# Patient Record
Sex: FEMALE | Race: WHITE | HISPANIC OR LATINO | URBAN - METROPOLITAN AREA
[De-identification: names, ages, dates, MRNs, and addresses within clinical notes are randomized per-mention and may not be internally consistent; named-entity substitution may affect disease eponyms.]

---

## 2018-05-15 PROBLEM — Z00.00 ENCOUNTER FOR PREVENTIVE HEALTH EXAMINATION: Status: ACTIVE | Noted: 2018-05-15

## 2018-05-22 ENCOUNTER — OUTPATIENT (OUTPATIENT)
Dept: OUTPATIENT SERVICES | Facility: HOSPITAL | Age: 63
LOS: 1 days | End: 2018-05-22
Payer: COMMERCIAL

## 2018-05-22 VITALS
HEART RATE: 80 BPM | RESPIRATION RATE: 16 BRPM | HEIGHT: 60 IN | SYSTOLIC BLOOD PRESSURE: 140 MMHG | TEMPERATURE: 98 F | WEIGHT: 136.03 LBS | DIASTOLIC BLOOD PRESSURE: 86 MMHG

## 2018-05-22 DIAGNOSIS — R93.5 ABNORMAL FINDINGS ON DIAGNOSTIC IMAGING OF OTHER ABDOMINAL REGIONS, INCLUDING RETROPERITONEUM: ICD-10-CM

## 2018-05-22 DIAGNOSIS — Z98.890 OTHER SPECIFIED POSTPROCEDURAL STATES: Chronic | ICD-10-CM

## 2018-05-22 DIAGNOSIS — N63.0 UNSPECIFIED LUMP IN UNSPECIFIED BREAST: Chronic | ICD-10-CM

## 2018-05-22 DIAGNOSIS — Z01.818 ENCOUNTER FOR OTHER PREPROCEDURAL EXAMINATION: ICD-10-CM

## 2018-05-22 LAB
ANION GAP SERPL CALC-SCNC: 7 MMOL/L — SIGNIFICANT CHANGE UP (ref 5–17)
BUN SERPL-MCNC: 17 MG/DL — SIGNIFICANT CHANGE UP (ref 7–23)
CALCIUM SERPL-MCNC: 9.8 MG/DL — SIGNIFICANT CHANGE UP (ref 8.5–10.1)
CHLORIDE SERPL-SCNC: 103 MMOL/L — SIGNIFICANT CHANGE UP (ref 96–108)
CO2 SERPL-SCNC: 32 MMOL/L — HIGH (ref 22–31)
CREAT SERPL-MCNC: 0.74 MG/DL — SIGNIFICANT CHANGE UP (ref 0.5–1.3)
GLUCOSE SERPL-MCNC: 131 MG/DL — HIGH (ref 70–99)
HCT VFR BLD CALC: 38.9 % — SIGNIFICANT CHANGE UP (ref 34.5–45)
HGB BLD-MCNC: 13.2 G/DL — SIGNIFICANT CHANGE UP (ref 11.5–15.5)
MCHC RBC-ENTMCNC: 30.6 PG — SIGNIFICANT CHANGE UP (ref 27–34)
MCHC RBC-ENTMCNC: 33.9 GM/DL — SIGNIFICANT CHANGE UP (ref 32–36)
MCV RBC AUTO: 90.3 FL — SIGNIFICANT CHANGE UP (ref 80–100)
NRBC # BLD: 0 /100 WBCS — SIGNIFICANT CHANGE UP (ref 0–0)
PLATELET # BLD AUTO: 250 K/UL — SIGNIFICANT CHANGE UP (ref 150–400)
POTASSIUM SERPL-MCNC: 4.2 MMOL/L — SIGNIFICANT CHANGE UP (ref 3.5–5.3)
POTASSIUM SERPL-SCNC: 4.2 MMOL/L — SIGNIFICANT CHANGE UP (ref 3.5–5.3)
RBC # BLD: 4.31 M/UL — SIGNIFICANT CHANGE UP (ref 3.8–5.2)
RBC # FLD: 12.9 % — SIGNIFICANT CHANGE UP (ref 10.3–14.5)
SODIUM SERPL-SCNC: 142 MMOL/L — SIGNIFICANT CHANGE UP (ref 135–145)
WBC # BLD: 6.1 K/UL — SIGNIFICANT CHANGE UP (ref 3.8–10.5)
WBC # FLD AUTO: 6.1 K/UL — SIGNIFICANT CHANGE UP (ref 3.8–10.5)

## 2018-05-22 PROCEDURE — 93010 ELECTROCARDIOGRAM REPORT: CPT | Mod: NC

## 2018-05-22 PROCEDURE — 85027 COMPLETE CBC AUTOMATED: CPT

## 2018-05-22 PROCEDURE — 36415 COLL VENOUS BLD VENIPUNCTURE: CPT

## 2018-05-22 PROCEDURE — 86901 BLOOD TYPING SEROLOGIC RH(D): CPT

## 2018-05-22 PROCEDURE — G0463: CPT

## 2018-05-22 PROCEDURE — 86900 BLOOD TYPING SEROLOGIC ABO: CPT

## 2018-05-22 PROCEDURE — 93005 ELECTROCARDIOGRAM TRACING: CPT

## 2018-05-22 PROCEDURE — 80048 BASIC METABOLIC PNL TOTAL CA: CPT

## 2018-05-22 PROCEDURE — 86850 RBC ANTIBODY SCREEN: CPT

## 2018-05-22 NOTE — H&P PST ADULT - FAMILY HISTORY
Mother  Still living? Yes, Estimated age: 81-90  Family history of type 2 diabetes mellitus in mother, Age at diagnosis: Age Unknown  Hypertension, Age at diagnosis: Age Unknown     Father  Still living? No  Family history of stroke, Age at diagnosis: Age Unknown

## 2018-05-22 NOTE — H&P PST ADULT - PSH
Breast nodule    Condyloma  vulvar laser  H/O colonoscopy    H/O:   x3  History of D&C    S/P Tubal Ligation

## 2018-05-22 NOTE — H&P PST ADULT - ASSESSMENT
63 yo female with abnormal findings on a diagnostic imaging scheduled for a D&C hysteroscopy on 5/30 with Dr. Anthony.

## 2018-05-22 NOTE — H&P PST ADULT - PMH
Abnormal findings on diagnostic imaging of other abdominal regions, including retroperitoneum    Asthma  mild(allergy related) pt notes "asthma attack when around dogs or cats"  Endometrial thickening on ultrasound    Hypertension    Kidney stones    Seasonal allergic rhinitis, unspecified trigger

## 2018-05-22 NOTE — H&P PST ADULT - HISTORY OF PRESENT ILLNESS
63 yo female with HTN LMP age 59, presents to [facility] scheduled for a D&C hysteroscopy on  with Dr. Anthony. Was found to have endometrial thickening on a routine ultrasound at her routine GYN visit. Denies pelvic pain and PMB. States that they need to a do a biopsy of the lining while she's asleep. An attempt in the office, was unsuccessful. H/O D&C 3-4 years ago secondary to heavy bleeding from fibroids. Denies recent H/O bleeding.

## 2018-05-22 NOTE — H&P PST ADULT - NSANTHOSAYNRD_GEN_A_CORE
No. HALINA screening performed.  STOP BANG Legend: 0-2 = LOW Risk; 3-4 = INTERMEDIATE Risk; 5-8 = HIGH Risk

## 2018-05-22 NOTE — H&P PST ADULT - PROBLEM SELECTOR PLAN 2
Labs- CBC, BMP, T&S and EKG  MC with Dr. CONNIE Naylor  Pre op instructions reviewed and given. Take routine am meds DOS with sip of water. Hold ASA and supplements for one week. Avoid NSAIDs and OTC supplements. Verbalized understanding

## 2018-05-29 ENCOUNTER — TRANSCRIPTION ENCOUNTER (OUTPATIENT)
Age: 63
End: 2018-05-29

## 2018-05-29 RX ORDER — SODIUM CHLORIDE 9 MG/ML
1000 INJECTION, SOLUTION INTRAVENOUS
Qty: 0 | Refills: 0 | Status: DISCONTINUED | OUTPATIENT
Start: 2018-05-30 | End: 2018-05-30

## 2018-05-30 ENCOUNTER — RESULT REVIEW (OUTPATIENT)
Age: 63
End: 2018-05-30

## 2018-05-30 ENCOUNTER — OUTPATIENT (OUTPATIENT)
Dept: OUTPATIENT SERVICES | Facility: HOSPITAL | Age: 63
LOS: 1 days | End: 2018-05-30
Payer: COMMERCIAL

## 2018-05-30 VITALS
DIASTOLIC BLOOD PRESSURE: 80 MMHG | RESPIRATION RATE: 14 BRPM | HEART RATE: 74 BPM | SYSTOLIC BLOOD PRESSURE: 150 MMHG | OXYGEN SATURATION: 98 % | TEMPERATURE: 98 F

## 2018-05-30 VITALS
RESPIRATION RATE: 14 BRPM | SYSTOLIC BLOOD PRESSURE: 125 MMHG | OXYGEN SATURATION: 97 % | TEMPERATURE: 98 F | HEIGHT: 60 IN | WEIGHT: 136.03 LBS | HEART RATE: 71 BPM | DIASTOLIC BLOOD PRESSURE: 86 MMHG

## 2018-05-30 DIAGNOSIS — Z98.890 OTHER SPECIFIED POSTPROCEDURAL STATES: Chronic | ICD-10-CM

## 2018-05-30 DIAGNOSIS — N63.0 UNSPECIFIED LUMP IN UNSPECIFIED BREAST: Chronic | ICD-10-CM

## 2018-05-30 DIAGNOSIS — R93.5 ABNORMAL FINDINGS ON DIAGNOSTIC IMAGING OF OTHER ABDOMINAL REGIONS, INCLUDING RETROPERITONEUM: ICD-10-CM

## 2018-05-30 DIAGNOSIS — Z01.818 ENCOUNTER FOR OTHER PREPROCEDURAL EXAMINATION: ICD-10-CM

## 2018-05-30 PROCEDURE — 88305 TISSUE EXAM BY PATHOLOGIST: CPT | Mod: 26

## 2018-05-30 PROCEDURE — 88305 TISSUE EXAM BY PATHOLOGIST: CPT

## 2018-05-30 PROCEDURE — 58558 HYSTEROSCOPY BIOPSY: CPT

## 2018-05-30 RX ORDER — OXYCODONE AND ACETAMINOPHEN 5; 325 MG/1; MG/1
1 TABLET ORAL EVERY 4 HOURS
Qty: 0 | Refills: 0 | Status: DISCONTINUED | OUTPATIENT
Start: 2018-05-30 | End: 2018-05-30

## 2018-05-30 RX ORDER — HYDROMORPHONE HYDROCHLORIDE 2 MG/ML
0.5 INJECTION INTRAMUSCULAR; INTRAVENOUS; SUBCUTANEOUS
Qty: 0 | Refills: 0 | Status: DISCONTINUED | OUTPATIENT
Start: 2018-05-30 | End: 2018-05-30

## 2018-05-30 RX ORDER — SODIUM CHLORIDE 9 MG/ML
1000 INJECTION, SOLUTION INTRAVENOUS
Qty: 0 | Refills: 0 | Status: DISCONTINUED | OUTPATIENT
Start: 2018-05-30 | End: 2018-05-30

## 2018-05-30 RX ORDER — ACETAMINOPHEN 500 MG
650 TABLET ORAL ONCE
Qty: 0 | Refills: 0 | Status: COMPLETED | OUTPATIENT
Start: 2018-05-30 | End: 2018-05-30

## 2018-05-30 RX ORDER — ONDANSETRON 8 MG/1
4 TABLET, FILM COATED ORAL ONCE
Qty: 0 | Refills: 0 | Status: DISCONTINUED | OUTPATIENT
Start: 2018-05-30 | End: 2018-05-30

## 2018-05-30 RX ORDER — METOCLOPRAMIDE HCL 10 MG
10 TABLET ORAL ONCE
Qty: 0 | Refills: 0 | Status: DISCONTINUED | OUTPATIENT
Start: 2018-05-30 | End: 2018-05-30

## 2018-05-30 RX ORDER — OXYCODONE HYDROCHLORIDE 5 MG/1
10 TABLET ORAL EVERY 6 HOURS
Qty: 0 | Refills: 0 | Status: DISCONTINUED | OUTPATIENT
Start: 2018-05-30 | End: 2018-05-30

## 2018-05-30 RX ORDER — OXYCODONE HYDROCHLORIDE 5 MG/1
5 TABLET ORAL EVERY 4 HOURS
Qty: 0 | Refills: 0 | Status: DISCONTINUED | OUTPATIENT
Start: 2018-05-30 | End: 2018-05-30

## 2018-05-30 RX ORDER — MEPERIDINE HYDROCHLORIDE 50 MG/ML
12.5 INJECTION INTRAMUSCULAR; INTRAVENOUS; SUBCUTANEOUS
Qty: 0 | Refills: 0 | Status: DISCONTINUED | OUTPATIENT
Start: 2018-05-30 | End: 2018-05-30

## 2018-05-30 RX ADMIN — Medication 650 MILLIGRAM(S): at 11:16

## 2018-05-30 RX ADMIN — SODIUM CHLORIDE 100 MILLILITER(S): 9 INJECTION, SOLUTION INTRAVENOUS at 16:17

## 2018-05-30 RX ADMIN — Medication 650 MILLIGRAM(S): at 11:14

## 2018-05-30 RX ADMIN — SODIUM CHLORIDE 50 MILLILITER(S): 9 INJECTION, SOLUTION INTRAVENOUS at 11:14

## 2018-05-30 NOTE — ASU DISCHARGE PLAN (ADULT/PEDIATRIC). - MEDICATION SUMMARY - MEDICATIONS TO TAKE
I will START or STAY ON the medications listed below when I get home from the hospital:    aspirin 81 mg oral tablet  -- 1 tab(s) by mouth 3 times a week - IN AM  -- Indication: For Cardiovascular agent    Vasotec 10 mg oral tablet  -- 1 tab(s) by mouth once a day - IN AM  -- Indication: For Antihypertensive    VENTOLIN HFA AER  -- Indication: For Bronchodilator    Norvasc 5 mg oral tablet  -- 1 tab(s) by mouth once a day - IN AM  -- Indication: For Antihypertensive    Cinnamon 500 mg oral capsule  -- 1 cap(s) by mouth once a day - IN AM  -- Indication: For Herbal    Centrum oral tablet  -- 1 tab(s) by mouth once a day - IN AM  -- Indication: For Vitamin    Caltrate 600 + D oral tablet  -- 1 tab(s) by mouth once a day - IN AM  -- Indication: For Vitamin

## 2018-05-30 NOTE — ASU DISCHARGE PLAN (ADULT/PEDIATRIC). - ACTIVITY LEVEL
nothing per rectum/no tampons/no exercise/no heavy lifting/nothing per vagina/no intercourse/no douching/no sports/gym/no tub baths

## 2018-06-01 LAB — SURGICAL PATHOLOGY FINAL REPORT - CH: SIGNIFICANT CHANGE UP

## 2019-04-29 PROBLEM — N20.0 CALCULUS OF KIDNEY: Chronic | Status: ACTIVE | Noted: 2018-05-21

## 2019-04-29 PROBLEM — J30.2 OTHER SEASONAL ALLERGIC RHINITIS: Chronic | Status: ACTIVE | Noted: 2018-05-21

## 2019-04-29 PROBLEM — R93.8 ABNORMAL FINDINGS ON DIAGNOSTIC IMAGING OF OTHER SPECIFIED BODY STRUCTURES: Chronic | Status: ACTIVE | Noted: 2018-05-22

## 2019-04-29 PROBLEM — R93.5 ABNORMAL FINDINGS ON DIAGNOSTIC IMAGING OF OTHER ABDOMINAL REGIONS, INCLUDING RETROPERITONEUM: Chronic | Status: ACTIVE | Noted: 2018-05-21

## 2019-05-20 ENCOUNTER — APPOINTMENT (OUTPATIENT)
Dept: SURGERY | Facility: CLINIC | Age: 64
End: 2019-05-20
Payer: COMMERCIAL

## 2019-05-20 VITALS
RESPIRATION RATE: 17 BRPM | SYSTOLIC BLOOD PRESSURE: 145 MMHG | WEIGHT: 139 LBS | DIASTOLIC BLOOD PRESSURE: 86 MMHG | TEMPERATURE: 98 F | OXYGEN SATURATION: 98 % | BODY MASS INDEX: 27.29 KG/M2 | HEIGHT: 60 IN | HEART RATE: 82 BPM

## 2019-05-20 DIAGNOSIS — Z98.890 OTHER SPECIFIED POSTPROCEDURAL STATES: ICD-10-CM

## 2019-05-20 DIAGNOSIS — I10 ESSENTIAL (PRIMARY) HYPERTENSION: ICD-10-CM

## 2019-05-20 PROCEDURE — 99244 OFF/OP CNSLTJ NEW/EST MOD 40: CPT

## 2019-05-20 RX ORDER — ATORVASTATIN CALCIUM 20 MG/1
20 TABLET, FILM COATED ORAL
Refills: 0 | Status: ACTIVE | COMMUNITY

## 2019-05-20 RX ORDER — ENALAPRIL MALEATE 10 MG/1
10 TABLET ORAL
Refills: 0 | Status: ACTIVE | COMMUNITY

## 2019-05-20 RX ORDER — AMLODIPINE BESYLATE 5 MG/1
5 TABLET ORAL
Refills: 0 | Status: ACTIVE | COMMUNITY

## 2019-06-17 NOTE — HISTORY OF PRESENT ILLNESS
[FreeTextEntry1] : 62 y/o female here for consultation for lump on the left buttock. Reports having the lump for years but has grown in size and noticeable now. Today patient has no pain but discomfort. No drainage or redness.

## 2019-06-17 NOTE — CONSULT LETTER
[Dear  ___] : Dear  [unfilled], [Consult Letter:] : I had the pleasure of evaluating your patient, [unfilled]. [Please see my note below.] : Please see my note below. [Consult Closing:] : Thank you very much for allowing me to participate in the care of this patient.  If you have any questions, please do not hesitate to contact me. [Sincerely,] : Sincerely, [FreeTextEntry3] : Suad Aldana M.D., F.A.C.S., F.FABIAN.S.C.R.S.\par Assistant Professor of Surgery\par [employer] [facility]\par \par  [FreeTextEntry2] : Dr Ritchie Anthony

## 2019-06-17 NOTE — ASSESSMENT
[FreeTextEntry1] : 64 yo female with a left buttock cyst. I recommended excision and discussed the details, risks, benefits and alternatives of the procedure and expectations of recovery, including the possibility of wound breakdown and secondary intent healing if that happens.  \par She will call my office if she wishes to proceed with the surgery.\par

## 2019-06-17 NOTE — PHYSICAL EXAM
[FreeTextEntry1] : This is a 63 year-old well-developed female in no apparent distress.\par \par HEENT normocephalic, anicteric, external ears normal bilaterally, EOMs intact.\par \par Lungs - clear to auscultation bilaterally\par \par Cardiac - regular rate and rhythm.\par \par Abdomen soft, nontender, nondistended, no masses. No hepatosplenomegaly.\par \par No inguinal lymphadenopathy bilaterally.\par \par There is a subcutaneous round growth most c/w a cyst at the junction of the left buttock and upper posterior thigh. Measures 3.5 x 3.5 cm.

## 2019-06-17 NOTE — ASSESSMENT
[FreeTextEntry1] : 62 yo female with a left buttock cyst. I recommended excision and discussed the details, risks, benefits and alternatives of the procedure and expectations of recovery, including the possibility of wound breakdown and secondary intent healing if that happens.  \par She will call my office if she wishes to proceed with the surgery.\par

## 2019-06-17 NOTE — HISTORY OF PRESENT ILLNESS
[FreeTextEntry1] : 64 y/o female here for consultation for lump on the left buttock. Reports having the lump for years but has grown in size and noticeable now. Today patient has no pain but discomfort. No drainage or redness.

## 2021-05-12 ENCOUNTER — APPOINTMENT (OUTPATIENT)
Dept: SURGERY | Facility: CLINIC | Age: 66
End: 2021-05-12
Payer: COMMERCIAL

## 2021-05-12 VITALS
HEIGHT: 60 IN | SYSTOLIC BLOOD PRESSURE: 128 MMHG | BODY MASS INDEX: 27.48 KG/M2 | HEART RATE: 79 BPM | RESPIRATION RATE: 18 BRPM | WEIGHT: 140 LBS | TEMPERATURE: 99 F | DIASTOLIC BLOOD PRESSURE: 82 MMHG | OXYGEN SATURATION: 100 %

## 2021-05-12 PROCEDURE — 99214 OFFICE O/P EST MOD 30 MIN: CPT

## 2021-05-12 PROCEDURE — 99072 ADDL SUPL MATRL&STAF TM PHE: CPT

## 2021-05-12 NOTE — PHYSICAL EXAM
[FreeTextEntry1] : This is a 65 year-old well-developed female in no apparent distress.\par \par HEENT normocephalic, anicteric, external ears normal bilaterally, EOMs intact.\par \par Cardiac - regular rate and rhythm.\par \par There is a subcutaneous round growth at the junction of the left buttock and upper posterior thigh. Measures 3.5 x 3.5 cm.  Differential includes cyst versus lipoma.  On today's exam it is most consistent with a lipoma.

## 2021-05-12 NOTE — CONSULT LETTER
[Dear  ___] : Dear  [unfilled], [Courtesy Letter:] : I had the pleasure of seeing your patient, [unfilled], in my office today. [Please see my note below.] : Please see my note below. [Consult Closing:] : Thank you very much for allowing me to participate in the care of this patient.  If you have any questions, please do not hesitate to contact me. [Sincerely,] : Sincerely, [FreeTextEntry2] : Dr Ritchie Anthony [FreeTextEntry3] : Suad Aldana M.D., F.A.C.S., F.FABIAN.S.C.R.S.\par Assistant Professor of Surgery\par [employer] at [facility]\par \par

## 2021-05-12 NOTE — ASSESSMENT
[FreeTextEntry1] : 64 yo female with a left buttock lipoma that has been getting progressively more symptomatic. I recommended excision and discussed the details, risks, benefits and alternatives of the procedure and expectations of recovery, including the possibility of wound breakdown and secondary intent healing if that happens.  \par She wishes to proceed with the surgery and will arrange with my office.\par

## 2021-05-12 NOTE — HISTORY OF PRESENT ILLNESS
[FreeTextEntry1] : 66 y/o female with a hx of a lump of the left buttock for which I evaluated her on 5/20/2019 and recommended excision.  \par Today she reports the left buttock lump has been getting bigger in size. States more discomfort with prolonged period of sitting. Denies the use of blood thinners.

## 2021-05-21 ENCOUNTER — NON-APPOINTMENT (OUTPATIENT)
Age: 66
End: 2021-05-21

## 2021-05-30 NOTE — H&P PST ADULT - VENOUS THROMBOEMBOLISM HISTORY
[FreeTextEntry1] : \par \par #Acid Reflux\par - Continue omeprazole\par - diet recommendations- avoid spicy, large meals, caffeine, avoid eating heavy at night\par \par #Constipation\par - Willl order for senna\par \par \par #Slightly Elevated BP\par - Advised to purchase home monitor and check at home\par - re-assess at next visit\par \par HCM- patient qualifies for CSP in August : Pap and Mammo\par \par RTC in August for CSP\par \par Above discussed with Dr. Peralta
negative history

## 2021-06-10 ENCOUNTER — OUTPATIENT (OUTPATIENT)
Dept: OUTPATIENT SERVICES | Facility: HOSPITAL | Age: 66
LOS: 1 days | End: 2021-06-10
Payer: COMMERCIAL

## 2021-06-10 VITALS
HEIGHT: 60 IN | WEIGHT: 138.89 LBS | RESPIRATION RATE: 16 BRPM | TEMPERATURE: 98 F | SYSTOLIC BLOOD PRESSURE: 136 MMHG | OXYGEN SATURATION: 98 % | DIASTOLIC BLOOD PRESSURE: 83 MMHG | HEART RATE: 81 BPM

## 2021-06-10 DIAGNOSIS — N63.0 UNSPECIFIED LUMP IN UNSPECIFIED BREAST: Chronic | ICD-10-CM

## 2021-06-10 DIAGNOSIS — Z01.818 ENCOUNTER FOR OTHER PREPROCEDURAL EXAMINATION: ICD-10-CM

## 2021-06-10 DIAGNOSIS — D17.1 BENIGN LIPOMATOUS NEOPLASM OF SKIN AND SUBCUTANEOUS TISSUE OF TRUNK: ICD-10-CM

## 2021-06-10 DIAGNOSIS — Z98.890 OTHER SPECIFIED POSTPROCEDURAL STATES: Chronic | ICD-10-CM

## 2021-06-10 LAB
ANION GAP SERPL CALC-SCNC: 15 MMOL/L — SIGNIFICANT CHANGE UP (ref 5–17)
BUN SERPL-MCNC: 17 MG/DL — SIGNIFICANT CHANGE UP (ref 7–23)
CALCIUM SERPL-MCNC: 10.6 MG/DL — HIGH (ref 8.4–10.5)
CHLORIDE SERPL-SCNC: 104 MMOL/L — SIGNIFICANT CHANGE UP (ref 96–108)
CO2 SERPL-SCNC: 23 MMOL/L — SIGNIFICANT CHANGE UP (ref 22–31)
CREAT SERPL-MCNC: 0.74 MG/DL — SIGNIFICANT CHANGE UP (ref 0.5–1.3)
GLUCOSE SERPL-MCNC: 88 MG/DL — SIGNIFICANT CHANGE UP (ref 70–99)
HCT VFR BLD CALC: 40.9 % — SIGNIFICANT CHANGE UP (ref 34.5–45)
HGB BLD-MCNC: 13.9 G/DL — SIGNIFICANT CHANGE UP (ref 11.5–15.5)
MCHC RBC-ENTMCNC: 31.2 PG — SIGNIFICANT CHANGE UP (ref 27–34)
MCHC RBC-ENTMCNC: 34 GM/DL — SIGNIFICANT CHANGE UP (ref 32–36)
MCV RBC AUTO: 91.7 FL — SIGNIFICANT CHANGE UP (ref 80–100)
NRBC # BLD: 0 /100 WBCS — SIGNIFICANT CHANGE UP (ref 0–0)
PLATELET # BLD AUTO: 245 K/UL — SIGNIFICANT CHANGE UP (ref 150–400)
POTASSIUM SERPL-MCNC: 3.8 MMOL/L — SIGNIFICANT CHANGE UP (ref 3.5–5.3)
POTASSIUM SERPL-SCNC: 3.8 MMOL/L — SIGNIFICANT CHANGE UP (ref 3.5–5.3)
RBC # BLD: 4.46 M/UL — SIGNIFICANT CHANGE UP (ref 3.8–5.2)
RBC # FLD: 12.3 % — SIGNIFICANT CHANGE UP (ref 10.3–14.5)
SODIUM SERPL-SCNC: 142 MMOL/L — SIGNIFICANT CHANGE UP (ref 135–145)
WBC # BLD: 8.8 K/UL — SIGNIFICANT CHANGE UP (ref 3.8–10.5)
WBC # FLD AUTO: 8.8 K/UL — SIGNIFICANT CHANGE UP (ref 3.8–10.5)

## 2021-06-10 PROCEDURE — G0463: CPT

## 2021-06-10 PROCEDURE — 80048 BASIC METABOLIC PNL TOTAL CA: CPT

## 2021-06-10 PROCEDURE — 85027 COMPLETE CBC AUTOMATED: CPT

## 2021-06-10 RX ORDER — LIDOCAINE HCL 20 MG/ML
0.2 VIAL (ML) INJECTION ONCE
Refills: 0 | Status: DISCONTINUED | OUTPATIENT
Start: 2021-06-24 | End: 2021-07-08

## 2021-06-10 RX ORDER — ASPIRIN/CALCIUM CARB/MAGNESIUM 324 MG
1 TABLET ORAL
Qty: 0 | Refills: 0 | DISCHARGE

## 2021-06-10 RX ORDER — SODIUM CHLORIDE 9 MG/ML
3 INJECTION INTRAMUSCULAR; INTRAVENOUS; SUBCUTANEOUS EVERY 8 HOURS
Refills: 0 | Status: DISCONTINUED | OUTPATIENT
Start: 2021-06-24 | End: 2021-07-08

## 2021-06-10 RX ORDER — AMLODIPINE BESYLATE 2.5 MG/1
1 TABLET ORAL
Qty: 0 | Refills: 0 | DISCHARGE

## 2021-06-10 NOTE — H&P PST ADULT - NSICDXPASTMEDICALHX_GEN_ALL_CORE_FT
PAST MEDICAL HISTORY:  Abnormal findings on diagnostic imaging of other abdominal regions, including retroperitoneum     Asthma mild(allergy related) pt notes "asthma attack when around dogs or cats"    Endometrial thickening on ultrasound     Hypercholesteremia     Hypertension     Kidney stones     Lipoma Left buttock    Seasonal allergic rhinitis, unspecified trigger

## 2021-06-10 NOTE — H&P PST ADULT - NSICDXPROBLEM_GEN_ALL_CORE_FT
PROBLEM DIAGNOSES  Problem: Lipoma of buttock  Assessment and Plan: Pt is scheduled for excision of lipoma of left buttock  NPO after 11PM  Pt advised to take Amlodipine and Enalapril in AM DOS with sip of water   will drive patient home DOS

## 2021-06-10 NOTE — H&P PST ADULT - HISTORY OF PRESENT ILLNESS
Pt is a 65y.o. female who presents with 4 year h/o a lipoma on her left buttock. Pt states it has increased in size and is now uncomfortable if she sits for a long time. She is scheduled for excision of lipoma of buttock with Dr. Aldana on 6/24/2021.    Pt had Pfizer Covid vaccine - 2nd dose May 2021- card presented at [facility] for confirmation. Pt is a 65y.o. female who presents with 4 year h/o a lipoma on her left buttock. Pt states it has increased in size and is now uncomfortable if she sits for a long time. She is scheduled for excision of lipoma of buttock with Dr. Aldana on 6/24/2021.    Pt had Pfizer Covid vaccine - 2nd dose May 2021- card presented at [facility] for confirmation. Documentation on chart.

## 2021-06-10 NOTE — H&P PST ADULT - NSICDXPASTSURGICALHX_GEN_ALL_CORE_FT
PAST SURGICAL HISTORY:  Breast nodule     Condyloma vulvar laser    H/O colonoscopy     H/O:  x3    History of D&C     S/P Tubal Ligation

## 2021-06-10 NOTE — H&P PST ADULT - NSICDXFAMILYHX_GEN_ALL_CORE_FT
FAMILY HISTORY:  Father  Still living? No  Family history of stroke, Age at diagnosis: Age Unknown    Mother  Still living? Yes, Estimated age: 81-90  Family history of type 2 diabetes mellitus in mother, Age at diagnosis: Age Unknown  Hypertension, Age at diagnosis: Age Unknown

## 2021-06-17 PROBLEM — D17.9 BENIGN LIPOMATOUS NEOPLASM, UNSPECIFIED: Chronic | Status: ACTIVE | Noted: 2021-06-10

## 2021-06-17 PROBLEM — E78.00 PURE HYPERCHOLESTEROLEMIA, UNSPECIFIED: Chronic | Status: ACTIVE | Noted: 2021-06-10

## 2021-06-23 ENCOUNTER — TRANSCRIPTION ENCOUNTER (OUTPATIENT)
Age: 66
End: 2021-06-23

## 2021-06-24 ENCOUNTER — OUTPATIENT (OUTPATIENT)
Dept: OUTPATIENT SERVICES | Facility: HOSPITAL | Age: 66
LOS: 1 days | End: 2021-06-24
Payer: COMMERCIAL

## 2021-06-24 ENCOUNTER — APPOINTMENT (OUTPATIENT)
Dept: SURGERY | Facility: HOSPITAL | Age: 66
End: 2021-06-24
Payer: COMMERCIAL

## 2021-06-24 ENCOUNTER — RESULT REVIEW (OUTPATIENT)
Age: 66
End: 2021-06-24

## 2021-06-24 VITALS
OXYGEN SATURATION: 98 % | WEIGHT: 138.89 LBS | DIASTOLIC BLOOD PRESSURE: 70 MMHG | RESPIRATION RATE: 16 BRPM | SYSTOLIC BLOOD PRESSURE: 113 MMHG | HEART RATE: 71 BPM | HEIGHT: 60 IN | TEMPERATURE: 98 F

## 2021-06-24 VITALS
HEART RATE: 80 BPM | SYSTOLIC BLOOD PRESSURE: 125 MMHG | RESPIRATION RATE: 17 BRPM | OXYGEN SATURATION: 98 % | TEMPERATURE: 99 F | DIASTOLIC BLOOD PRESSURE: 70 MMHG

## 2021-06-24 DIAGNOSIS — N63.0 UNSPECIFIED LUMP IN UNSPECIFIED BREAST: Chronic | ICD-10-CM

## 2021-06-24 DIAGNOSIS — Z98.890 OTHER SPECIFIED POSTPROCEDURAL STATES: Chronic | ICD-10-CM

## 2021-06-24 DIAGNOSIS — D17.1 BENIGN LIPOMATOUS NEOPLASM OF SKIN AND SUBCUTANEOUS TISSUE OF TRUNK: ICD-10-CM

## 2021-06-24 PROCEDURE — C1889: CPT

## 2021-06-24 PROCEDURE — 88304 TISSUE EXAM BY PATHOLOGIST: CPT | Mod: 26

## 2021-06-24 PROCEDURE — 12032 INTMD RPR S/A/T/EXT 2.6-7.5: CPT | Mod: 59

## 2021-06-24 PROCEDURE — 27043 EXC HIP PELVIS LES SC 3 CM/>: CPT

## 2021-06-24 PROCEDURE — 27043 EXC HIP PELVIS LES SC 3 CM/>: CPT | Mod: LT

## 2021-06-24 PROCEDURE — 88304 TISSUE EXAM BY PATHOLOGIST: CPT

## 2021-06-24 RX ORDER — ATORVASTATIN CALCIUM 80 MG/1
1 TABLET, FILM COATED ORAL
Qty: 0 | Refills: 0 | DISCHARGE

## 2021-06-24 RX ORDER — OXYCODONE HYDROCHLORIDE 5 MG/1
1 TABLET ORAL
Qty: 5 | Refills: 0
Start: 2021-06-24 | End: 2021-06-24

## 2021-06-24 RX ORDER — MULTIVIT-MIN/FERROUS GLUCONATE 9 MG/15 ML
1 LIQUID (ML) ORAL
Qty: 0 | Refills: 0 | DISCHARGE

## 2021-06-24 RX ORDER — ALBUTEROL 90 UG/1
0 AEROSOL, METERED ORAL
Qty: 54 | Refills: 0 | DISCHARGE

## 2021-06-24 RX ORDER — ONDANSETRON 8 MG/1
4 TABLET, FILM COATED ORAL ONCE
Refills: 0 | Status: DISCONTINUED | OUTPATIENT
Start: 2021-06-24 | End: 2021-07-08

## 2021-06-24 RX ORDER — OXYCODONE HYDROCHLORIDE 5 MG/1
5 TABLET ORAL ONCE
Refills: 0 | Status: DISCONTINUED | OUTPATIENT
Start: 2021-06-24 | End: 2021-06-24

## 2021-06-24 RX ORDER — CHLORHEXIDINE GLUCONATE 213 G/1000ML
1 SOLUTION TOPICAL ONCE
Refills: 0 | Status: COMPLETED | OUTPATIENT
Start: 2021-06-24 | End: 2021-06-24

## 2021-06-24 RX ORDER — FENTANYL CITRATE 50 UG/ML
25 INJECTION INTRAVENOUS
Refills: 0 | Status: DISCONTINUED | OUTPATIENT
Start: 2021-06-24 | End: 2021-06-24

## 2021-06-24 RX ORDER — CINNAMON BARK 500 MG
1 CAPSULE ORAL
Qty: 0 | Refills: 0 | DISCHARGE

## 2021-06-24 RX ORDER — ACETAMINOPHEN 500 MG
2 TABLET ORAL
Qty: 0 | Refills: 0 | DISCHARGE

## 2021-06-24 RX ORDER — AMLODIPINE BESYLATE 2.5 MG/1
1 TABLET ORAL
Qty: 0 | Refills: 0 | DISCHARGE

## 2021-06-24 RX ADMIN — CHLORHEXIDINE GLUCONATE 1 APPLICATION(S): 213 SOLUTION TOPICAL at 09:53

## 2021-06-24 NOTE — BRIEF OPERATIVE NOTE - NSICDXBRIEFPROCEDURE_GEN_ALL_CORE_FT
PROCEDURES:  Excision, neoplasm, subcutaneous tissue, pelvis and hip region, 3 cm or greater in diameter 24-Jun-2021 12:12:10  Nikoel Dick

## 2021-06-24 NOTE — ASU PREOP CHECKLIST - ALLERGIES REVIEWED
toward goals. [] No change. [x] Other: Pt continues with hip flexor spasm B. Trunk rotation L is limited. Recommended to patient that he stretch hip flexors instead of LB if he has sensation that he needs to stretch his LB out. Still having difficulty activating glute and subs with hamstring. STG: (to be met in 6 treatments)  1. ? Pain:3/10 and centralize to LB  2. ? ROM:Normal Trunk flexion and extension, normal hip extension B to allow pt to activate glutes for posture   3. ? Function: Pt able to perform sit to stand with ease and no compensation as well as able to perform glute dominant squatting and lunging for strength training and for back protection during functional activities  4. Able to hold corrections at pelvis and spine to allow pt to be more successful at holding good posture   5. Independent with Home Exercise Programs     LTG: (to be met in 12 treatments)  1. 5/5 hip strength in ext, abd, ER to allow pt to reduce load on back  2. Oswestry at 10% or less                 Patient goals: Get rid of pain    Goal treatment visit modified as mistake made in documentation on eval with visit number stating 5 for STG and 10 LTG. Should read as above with STG 6 treatments and LTG 12 treatments. Pt. Education:  [x] Yes  [] No  [x] Reviewed Prior HEP/Ed  Method of Education: [x] Verbal  [x] Demo  [x] Written  Comprehension of Education:  [x] Verbalizes understanding. [] Demonstrates understanding. [x] Needs review. [] Demonstrates/verbalizes HEP/Ed previously given. Plan: [x] Continue per plan of care.    [] Other:      Time In:0910            Time Out: 1832    Electronically signed by:  Kenneth Gustafson, PT done

## 2021-06-24 NOTE — ASU DISCHARGE PLAN (ADULT/PEDIATRIC) - CARE PROVIDER_API CALL
Suad Aldana)  ColonRectal Surgery; Surgery  [address], [address]  [place] [address]  Phone: [phone]  Fax: [phone]  Follow Up Time: 2 weeks

## 2021-06-24 NOTE — ASU PATIENT PROFILE, ADULT - PMH
Abnormal findings on diagnostic imaging of other abdominal regions, including retroperitoneum    Asthma  mild(allergy related) pt notes "asthma attack when around dogs or cats"  Endometrial thickening on ultrasound    Hypercholesteremia    Hypertension    Kidney stones    Lipoma  Left buttock  Seasonal allergic rhinitis, unspecified trigger

## 2021-06-24 NOTE — ASU DISCHARGE PLAN (ADULT/PEDIATRIC) - ASU DC SPECIAL INSTRUCTIONSFT
WOUND CARE: There is a dressing on the wound, which consists of 2 layers.  The outer layer is a clear plastic dressing (called Opsite) which is waterproof.  This should remain in place for 2 days post-operatively.  On the 2nd post-operative day, you should remove the clear plastic Tegaderm dressing.  Underneath the Tegaderm dressing, there are white surgical tapes (called steri strips) which are directly adherent to the skin.  These should remain on the skin, and will peel off naturally.  All of the stitches are “internal” and will dissolve naturally.    PAIN MEDICATION: You may take extra-strength Tylenol as prescribed on the bottle. You may take the prescribed medication, for breakthrough pain. Please don't take Motrin, Advil or any other anti-inflammatory medications for two days after your operation, as these medications may cause bleeding or bruising.    BATHING: Please do not submerge wound underwater. You may shower and/or sponge bathe.    ACTIVITY: No heavy lifting anything more than 10-15lbs or straining. Otherwise, you may return to your usual level of physical activity. If you are taking narcotic pain medication (such as Percocet, Oxycodone, Tramadol), do NOT drive a car, operate machinery or make important decisions.    DIET: Regular diet    NOTIFY YOUR SURGEON IF: You have any bleeding that does not stop, any pus draining from your wound, any fever (over 100.4 F) or chills, persistent nausea/vomiting with inability to tolerate food or liquids, persistent diarrhea, or if your pain is not controlled on your discharge pain medications.    FOLLOW-UP:  1. Please call to make a follow-up appointment with Dr. Aldana within 10-14 days of discharge   2. Please follow up with your primary care physician in one week regarding your hospitalization. WOUND CARE: There is a dressing on the wound, which consists of 2 layers.  The outer layer is a clear plastic dressing (called Opsite) which is waterproof.  This should remain in place for 2 days post-operatively.  On the 2nd post-operative day, you should remove the clear plastic Tegaderm dressing.  Underneath the Tegaderm dressing, there are white surgical tapes (called steri strips) which are directly adherent to the skin.  These should remain on the skin, and will peel off naturally.  All of the stitches are “internal” and will dissolve naturally.    BOWEL MOVEMENT: If you had not had a bowel movement by two days after your surgery (Saturday afternoon), you may take Miralax as needed.     PAIN MEDICATION: You may take extra-strength Tylenol as prescribed on the bottle. You may take the prescribed medication, for breakthrough pain. Please don't take Motrin, Advil or any other anti-inflammatory medications for two days after your operation, as these medications may cause bleeding or bruising.    BATHING: Please do not submerge wound underwater. You may shower and/or sponge bathe.    ACTIVITY: No heavy lifting anything more than 10-15lbs or straining. Otherwise, you may return to your usual level of physical activity. If you are taking narcotic pain medication (such as Percocet, Oxycodone, Tramadol), do NOT drive a car, operate machinery or make important decisions.    DIET: Regular diet    NOTIFY YOUR SURGEON IF: You have any bleeding that does not stop, any pus draining from your wound, any fever (over 100.4 F) or chills, persistent nausea/vomiting with inability to tolerate food or liquids, persistent diarrhea, or if your pain is not controlled on your discharge pain medications.    FOLLOW-UP:  1. Please call to make a follow-up appointment with Dr. Aldana within 10-14 days of discharge   2. Please follow up with your primary care physician in one week regarding your hospitalization.

## 2021-06-24 NOTE — ASU DISCHARGE PLAN (ADULT/PEDIATRIC) - NURSING INSTRUCTIONS
Tylenol/acetaminophen  as needed for pain/discomfort.  NEXT DOSE:   Tylenol  OK @  4:20pm this afternoon, if needed.  Follow up with MD as requested; call office for appointment.

## 2021-06-30 LAB — SURGICAL PATHOLOGY STUDY: SIGNIFICANT CHANGE UP

## 2021-07-07 ENCOUNTER — APPOINTMENT (OUTPATIENT)
Dept: SURGERY | Facility: CLINIC | Age: 66
End: 2021-07-07
Payer: COMMERCIAL

## 2021-07-07 VITALS
WEIGHT: 138 LBS | HEART RATE: 80 BPM | HEIGHT: 60 IN | TEMPERATURE: 98.2 F | BODY MASS INDEX: 27.09 KG/M2 | SYSTOLIC BLOOD PRESSURE: 122 MMHG | DIASTOLIC BLOOD PRESSURE: 74 MMHG | OXYGEN SATURATION: 98 % | RESPIRATION RATE: 17 BRPM

## 2021-07-07 PROCEDURE — 99024 POSTOP FOLLOW-UP VISIT: CPT

## 2021-07-12 NOTE — HISTORY OF PRESENT ILLNESS
[FreeTextEntry1] : Meg is a 64 y/o female here for a post-op visit. Pathology: Lipoma left buttock- consistent with lipoma.\par Pt having incisional soreness, mostly when she sits and walks. Overall she has had uneventful recovery so far w/o significant pain. She is not currently taking any pain medication. Feels a "hard lump". Denies incisional drainage. She is showering daily and keeping the area clean. Tolerating PO without issue. Having normal BMs.

## 2021-07-12 NOTE — PHYSICAL EXAM
[FreeTextEntry1] : Left buttock incision c/d/i, with expected moderate postop induration and swelling. No erythema.

## 2021-07-12 NOTE — ASSESSMENT
[FreeTextEntry1] : Doing well postop.\par Discussed wound care, activity, all questions answered.\par RTO in 5 weeks.

## 2021-09-08 ENCOUNTER — APPOINTMENT (OUTPATIENT)
Dept: SURGERY | Facility: CLINIC | Age: 66
End: 2021-09-08
Payer: COMMERCIAL

## 2021-09-08 VITALS
TEMPERATURE: 97.7 F | RESPIRATION RATE: 16 BRPM | OXYGEN SATURATION: 97 % | DIASTOLIC BLOOD PRESSURE: 78 MMHG | SYSTOLIC BLOOD PRESSURE: 144 MMHG | HEART RATE: 77 BPM

## 2021-09-08 DIAGNOSIS — Z09 ENCOUNTER FOR FOLLOW-UP EXAMINATION AFTER COMPLETED TREATMENT FOR CONDITIONS OTHER THAN MALIGNANT NEOPLASM: ICD-10-CM

## 2021-09-08 DIAGNOSIS — Z86.018 PERSONAL HISTORY OF OTHER BENIGN NEOPLASM: ICD-10-CM

## 2021-09-08 DIAGNOSIS — L72.9 FOLLICULAR CYST OF THE SKIN AND SUBCUTANEOUS TISSUE, UNSPECIFIED: ICD-10-CM

## 2021-09-08 PROCEDURE — 99024 POSTOP FOLLOW-UP VISIT: CPT

## 2021-09-08 NOTE — HISTORY OF PRESENT ILLNESS
[FreeTextEntry1] : Meg is a 64 y/o female here for a post-op appointment. She was last seen 7/7/21- Left buttock incision c/d/i, with expected moderate postop induration and swelling. No erythema. \par Today, pt reports a prickly spot in her scar that she can feel.  She has incisional discomfort when she sits for long periods of time and when the weather is rainy. She takes Tylenol PRN with relief, she does so every few weeks. Denies incisional drainage. \par

## 2021-09-08 NOTE — PHYSICAL EXAM
[FreeTextEntry1] : Left buttock incision has healed very well.  Postop induration has resolved.  There is no erythema. \par The left lateral corner of the scar has a 2 mm spot from a previous scab that is palpable and is what patient is feeling.  This was simply a spot of scar tissue from the scab and I remove that with scissors.

## 2023-03-04 ENCOUNTER — EMERGENCY (EMERGENCY)
Facility: HOSPITAL | Age: 68
LOS: 1 days | Discharge: HOME | End: 2023-03-04
Attending: EMERGENCY MEDICINE | Admitting: EMERGENCY MEDICINE
Payer: MEDICARE

## 2023-03-04 VITALS
TEMPERATURE: 99 F | RESPIRATION RATE: 18 BRPM | OXYGEN SATURATION: 99 % | DIASTOLIC BLOOD PRESSURE: 93 MMHG | SYSTOLIC BLOOD PRESSURE: 160 MMHG | HEART RATE: 94 BPM

## 2023-03-04 DIAGNOSIS — N63.0 UNSPECIFIED LUMP IN UNSPECIFIED BREAST: Chronic | ICD-10-CM

## 2023-03-04 DIAGNOSIS — Z98.890 OTHER SPECIFIED POSTPROCEDURAL STATES: Chronic | ICD-10-CM

## 2023-03-04 PROCEDURE — 99284 EMERGENCY DEPT VISIT MOD MDM: CPT | Mod: CS

## 2023-03-04 RX ORDER — PSEUDOEPHEDRINE HCL 30 MG
30 TABLET ORAL ONCE
Refills: 0 | Status: COMPLETED | OUTPATIENT
Start: 2023-03-04 | End: 2023-03-04

## 2023-03-04 RX ADMIN — Medication 30 MILLIGRAM(S): at 07:51

## 2023-03-04 NOTE — ED ADULT NURSE NOTE - OBJECTIVE STATEMENT
Received the patient in intake 5 with c/o cough and chest congestion and headache. Patient tested positive for covid  4 days ago.  Patient states she is unable to clear the sputum. not in acute distress. alert and oriented x 4, safety maintained. Respirations are even and non labored. Patient for saline nebs as per order. Will continue to monitor.

## 2023-03-04 NOTE — ED PROVIDER NOTE - ENMT NEGATIVE STATEMENT, MLM
Ears: no ear pain and no hearing problems. Nose: +nasal congestion and no nasal drainage. Mouth/Throat: no dysphagia, no hoarseness and no throat pain. Neck: no lumps, no pain, no stiffness and no swollen glands.

## 2023-03-04 NOTE — ED PROVIDER NOTE - PATIENT PORTAL LINK FT
You can access the FollowMyHealth Patient Portal offered by [facility] by registering at the following website: [email or web address]. By joining [facility]’s FollowMyHealth portal, you will also be able to view your health information using other applications (apps) compatible with our system.

## 2023-03-04 NOTE — ED ADULT NURSE NOTE - NSIMPLEMENTINTERV_GEN_ALL_ED
Implemented All Universal Safety Interventions:  [place] to call system. Call bell, personal items and telephone within reach. Instruct patient to call for assistance. Room bathroom lighting operational. Non-slip footwear when patient is off stretcher. Physically safe environment: no spills, clutter or unnecessary equipment. Stretcher in lowest position, wheels locked, appropriate side rails in place.

## 2023-03-04 NOTE — ED PROVIDER NOTE - OBJECTIVE STATEMENT
67-year-old female history of hypertension presents emergency department with cough congestion low-grade fever testing positive for COVID at home.  Positive fever 100.6.  Patient states had initially body aches but is currently symptomatically improved with persistent congestion.  Patient denies any cough states she feels like she has persistent congestion that she can cough up.  Patient otherwise speaking full third through no acute distress.

## 2023-03-04 NOTE — ED PROVIDER NOTE - CLINICAL SUMMARY MEDICAL DECISION MAKING FREE TEXT BOX
Well-appearing female presents with chest congestion with nonproductive cough.  Patient states she feels that she needs to get mucus out.  Patient otherwise denies any fever any no body aches overall well-appearing initially requesting x-ray after symptomatic improvement with nebulized saline requesting DC home lungs clear to auscultation likely viral infection.  Likely viral congestion.  Will DC with symptomatic treatment.

## 2024-04-15 NOTE — H&P PST ADULT - ACTIVITY
No formal exercise regimen- walks/gardens daily no blurred vision L/no blurred vision R/no loss of vision L/no loss of vision R

## 2025-07-21 NOTE — ED PROVIDER NOTE - CPE EDP CARDIAC NORM
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]  Weakness [R53.1]       Treatment Diagnosis: Weakness    Visit Information:  Insurance: Payor: [employer] [facility] / Plan: [employer] [facility] [place] / Product Type: *No Product type* /   PT Visit Information  PT Insurance Information: [facility]  Total # of Visits Approved: 24  Total # of Visits to Date: 3  Plan of Care/Certification Expiration Date: 25  No Show: 0  Canceled Appointment: 5  Progress Note Counter:     Subjective Information:  Subjective: Mom reports patient has a Neurology appt in two days.  HEP Compliance:  [x] Good [] Fair [] Poor [] Reports not doing due to:               Pain Screening  Patient Currently in Pain: No    Treatment:  Exercises:  Exercises  Exercise 1: Head righting seated on ground  Exercise 3: Protective extension reactions in sitting maxA - joint compressions through UEs  Exercise 5: Sitting with Min A at trunk  Exercise 8: Sitting on dynadisc- facilitated righting reactions, static sit  Exercise 10: tall kneel at wedge with max A  Exercise 11: static stand on floor with knee blocking and trunk support- decreased LE WBing  Exercise 12: Joint compressions through trunk in supported sitting  Exercise 13: Sidelying <> sit maxA  Exercise 14: Quadruped with approximations  Exercise 15: Army crawl max assist, Fair UE WBing but prefers extension         *Indicates exercise, modality, or manual techniques to be initiated when appropriate           Assessment:   Body Structures, Functions, Activity Limitations Requiring Skilled Therapeutic Intervention: Decreased strength, Decreased balance, Decreased posture  Assessment: Patient with decreased protective extension 
normal...